# Patient Record
Sex: FEMALE
[De-identification: names, ages, dates, MRNs, and addresses within clinical notes are randomized per-mention and may not be internally consistent; named-entity substitution may affect disease eponyms.]

---

## 2022-09-05 ENCOUNTER — NURSE TRIAGE (OUTPATIENT)
Dept: OTHER | Facility: CLINIC | Age: 27
End: 2022-09-05

## 2022-09-05 NOTE — TELEPHONE ENCOUNTER
Reason for Disposition   [1] COVID-19 diagnosed by positive lab test (e.g., PCR, rapid self-test kit) AND [2] mild symptoms (e.g., cough, fever, others) AND [3] no complications or SOB    Protocols used: Coronavirus (COVID-19) Diagnosed or Suspected-ADULT-    Subjective: Caller states \"I had a positive Covid test this morning\"     Current Symptoms: Caller reports a fever and sore neck    Onset: 1 day    Associated Symptoms: Pain and fever    Pain Severity: mild    Temperature: 100.3    What has been tried: Dayquil    LMP: NA Pregnant: No    Recommended disposition: Home care advice    Care advice provided, patient verbalizes understanding; denies any other questions or concerns; instructed to call back for any new or worsening symptoms. This triage is a result of a call to [address] [place] [address]. Please do not respond to the triage nurse through this encounter. Any subsequent communication should be directly with the patient.

## 2023-11-09 NOTE — PROGRESS NOTES
"Subjective   Patient ID: Karlie Bay is a 28 y.o. female who presents for Annual Exam.    HPI    Annual physical     Eats a generally healthy diet   Exercises 2 x  a week  Denies any chest pain,SOB  No Abdominal pain   No black or bloody stools   Urination/BM normal   Last eye apt 20 years   Last dental apt 6 month  Last Gyn apt 1 year  No new family h/o cancers or heart disease  Does get occasional constipation.    Declined flu vacc today    No other concern    Review of systems  ; Patient seen today for exam denies any problems with headaches or vision, denies any shortness of breath chest pain nausea or vomiting, no black stool no blood in the stool no heartburn type symptoms denies any problems with constipation or diarrhea, and no dysuria-type symptoms    The patient's allergies medications were reviewed with them today    The patient's social family and surgical history or also reviewed here today, along with her past medical history.     Objective     Alert and active in  no acute distress  HEENT TMs clear oropharynx negative nares clear no drainage noted neck supple  With no adenopathy   Heart regular rate and rhythm without murmur and no carotid bruits  Lungs- clear to auscultation bilaterally, no wheeze or rhonchi noted  Thyroid -negative masses or nodularity  Abdomen- soft times four quadrants , bowel sounds positive no masses or organomegaly, negative tenderness guarding or rebound  Neurological exam unremarkable- DTRs in upper and lower extremities within normal limits.   skin -no lesions noted      BP 96/58 (BP Location: Right arm, Patient Position: Sitting, BP Cuff Size: Adult)   Pulse 76   Temp 36.5 °C (97.7 °F) (Temporal)   Resp 16   Ht 1.676 m (5' 6\")   Wt 66.2 kg (146 lb)   SpO2 98%   BMI 23.57 kg/m²     Allergies   Allergen Reactions    Amoxicillin Hives and Rash     Happened when she was much younger       Assessment/Plan   Problem List Items Addressed This Visit       BMI 23.0-23.9, " adult    Routine general medical examination at a health care facility - Primary    Relevant Orders    Lipid Panel    CBC and Auto Differential    Comprehensive Metabolic Panel     Form completed for her today.    Recommend less bananas.     Labs have been ordered, she/he will have these performed and we will contact her/him with results.  (CBC, CMP, Lipid)    If anything worsens or changes please call us at once, follow up in the office as planned.    Scribe Attestation  By signing my name below, I, Trisha Acosta MA, Scribe   attest that this documentation has been prepared under the direction and in the presence of Stephan Spaulding DO.

## 2023-11-10 ENCOUNTER — OFFICE VISIT (OUTPATIENT)
Dept: PRIMARY CARE | Facility: CLINIC | Age: 28
End: 2023-11-10
Payer: COMMERCIAL

## 2023-11-10 VITALS
SYSTOLIC BLOOD PRESSURE: 96 MMHG | DIASTOLIC BLOOD PRESSURE: 58 MMHG | WEIGHT: 146 LBS | RESPIRATION RATE: 16 BRPM | BODY MASS INDEX: 23.46 KG/M2 | TEMPERATURE: 97.7 F | HEIGHT: 66 IN | HEART RATE: 76 BPM | OXYGEN SATURATION: 98 %

## 2023-11-10 DIAGNOSIS — Z00.00 ROUTINE GENERAL MEDICAL EXAMINATION AT A HEALTH CARE FACILITY: Primary | ICD-10-CM

## 2023-11-10 PROCEDURE — 1036F TOBACCO NON-USER: CPT | Performed by: FAMILY MEDICINE

## 2023-11-10 PROCEDURE — 99395 PREV VISIT EST AGE 18-39: CPT | Performed by: FAMILY MEDICINE

## 2023-11-10 PROCEDURE — 3008F BODY MASS INDEX DOCD: CPT | Performed by: FAMILY MEDICINE

## 2023-11-10 ASSESSMENT — PATIENT HEALTH QUESTIONNAIRE - PHQ9
2. FEELING DOWN, DEPRESSED OR HOPELESS: NOT AT ALL
1. LITTLE INTEREST OR PLEASURE IN DOING THINGS: NOT AT ALL
SUM OF ALL RESPONSES TO PHQ9 QUESTIONS 1 AND 2: 0

## 2024-12-05 PROBLEM — Z13.220 LIPID SCREENING: Status: ACTIVE | Noted: 2023-11-10

## 2024-12-05 NOTE — PROGRESS NOTES
Subjective   Patient ID: Karlie Bay is a 29 y.o. female who presents for Annual Exam.  HPI  Annual physical   Eats a generally healthy diet   Exercises regularly  Denies any chest pain,SOB  Last eye apt: never  Last dental apt: 6 months ago     Repeat 100/58    Patient would like her right foot second to the pinky toe checked.   Patient states when she was pregnant, she tripped over a bouncer and the toe turned purple and lasted for 2-3 weeks.  Currently, it is looking bigger than the other toes.   Patient states it is still bothering her.    Pt is having coughs with clear mucus since Friday   She did not test for Covid     She is in postpartum phase.   Her baby was born on 10/5/24 through [facility].   She gained weight during pregnancy, reducing now.   Not breast-feeding  She is on birth control pill  She will take some more time to get back to work      No other concern/question   Review of systems  ; Patient seen today for exam denies any problems with headaches or vision, denies any shortness of breath chest pain nausea or vomiting, no black stool no blood in the stool no heartburn type symptoms denies any problems with constipation or diarrhea, and no dysuria-type symptoms    The patient's allergies medications were reviewed with them today    The patient's social family and surgical history or also reviewed here today, along with her past medical history.     Objective     Alert and active in  no acute distress  HEENT TMs clear oropharynx negative nares clear no drainage noted neck supple  With no adenopathy   Heart regular rate and rhythm without murmur and no carotid bruits  Lungs- clear to auscultation bilaterally, no wheeze or rhonchi noted  Thyroid -negative masses or nodularity  Abdomen- soft times four quadrants , bowel sounds positive no masses or organomegaly, negative tenderness guarding or rebound  Neurological exam unremarkable- DTRs in upper and lower extremities within normal limits.   skin -no  "lesions noted    Right foot second toe healed well probable small avulsion in the past slight sensitivity no other abnormality noted      /64 (BP Location: Left arm, Patient Position: Sitting)   Pulse 79   Temp 36.1 °C (96.9 °F) (Temporal)   Ht 1.676 m (5' 6\")   Wt 72.6 kg (160 lb)   LMP  (LMP Unknown)   SpO2 99%   BMI 25.82 kg/m²     Allergies   Allergen Reactions    Amoxicillin Hives and Rash     Happened when she was much younger       Assessment/Plan   Problem List Items Addressed This Visit       Lipid screening    Relevant Orders    Lipid Panel     Other Visit Diagnoses       Routine general medical examination at a health care facility    -  Primary    BMI 25.0-25.9,adult        Acute recurrent frontal sinusitis        Relevant Medications    azithromycin (Zithromax) 250 mg tablet          Ordered BW (lipid) today.    Prescribed Zithromax today. Advised to start the medication if coughs not improved in the next 2-3 days, otherwise hold.     Recommended to ear comfortable shoes.    If anything worsens or changes please call us at once, follow up in the office as planned,       Scribe Attestation  By signing my name below, IEllen, Scribe   attest that this documentation has been prepared under the direction and in the presence of Stephan Spaulding DO.  "

## 2024-12-06 ENCOUNTER — APPOINTMENT (OUTPATIENT)
Dept: PRIMARY CARE | Facility: CLINIC | Age: 29
End: 2024-12-06
Payer: COMMERCIAL

## 2024-12-06 VITALS
OXYGEN SATURATION: 99 % | HEIGHT: 66 IN | SYSTOLIC BLOOD PRESSURE: 102 MMHG | BODY MASS INDEX: 25.71 KG/M2 | WEIGHT: 160 LBS | HEART RATE: 79 BPM | TEMPERATURE: 96.9 F | DIASTOLIC BLOOD PRESSURE: 64 MMHG

## 2024-12-06 DIAGNOSIS — Z00.00 ROUTINE GENERAL MEDICAL EXAMINATION AT A HEALTH CARE FACILITY: Primary | ICD-10-CM

## 2024-12-06 DIAGNOSIS — J01.11 ACUTE RECURRENT FRONTAL SINUSITIS: ICD-10-CM

## 2024-12-06 DIAGNOSIS — Z13.220 LIPID SCREENING: ICD-10-CM

## 2024-12-06 PROCEDURE — 99395 PREV VISIT EST AGE 18-39: CPT | Performed by: FAMILY MEDICINE

## 2024-12-06 PROCEDURE — 3008F BODY MASS INDEX DOCD: CPT | Performed by: FAMILY MEDICINE

## 2024-12-06 PROCEDURE — 1036F TOBACCO NON-USER: CPT | Performed by: FAMILY MEDICINE

## 2024-12-06 RX ORDER — AZITHROMYCIN 250 MG/1
TABLET, FILM COATED ORAL
Qty: 6 TABLET | Refills: 0 | Status: SHIPPED | OUTPATIENT
Start: 2024-12-06

## 2024-12-06 ASSESSMENT — PATIENT HEALTH QUESTIONNAIRE - PHQ9
2. FEELING DOWN, DEPRESSED OR HOPELESS: NOT AT ALL
SUM OF ALL RESPONSES TO PHQ9 QUESTIONS 1 AND 2: 0
1. LITTLE INTEREST OR PLEASURE IN DOING THINGS: NOT AT ALL
1. LITTLE INTEREST OR PLEASURE IN DOING THINGS: NOT AT ALL
SUM OF ALL RESPONSES TO PHQ9 QUESTIONS 1 AND 2: 0
2. FEELING DOWN, DEPRESSED OR HOPELESS: NOT AT ALL

## 2025-08-28 ENCOUNTER — APPOINTMENT (OUTPATIENT)
Dept: PRIMARY CARE | Facility: CLINIC | Age: 30
End: 2025-08-28
Payer: COMMERCIAL

## 2025-08-28 VITALS
SYSTOLIC BLOOD PRESSURE: 112 MMHG | OXYGEN SATURATION: 99 % | HEIGHT: 67 IN | WEIGHT: 154 LBS | HEART RATE: 80 BPM | BODY MASS INDEX: 24.17 KG/M2 | TEMPERATURE: 97.8 F | DIASTOLIC BLOOD PRESSURE: 70 MMHG

## 2025-08-28 DIAGNOSIS — Z00.00 ROUTINE GENERAL MEDICAL EXAMINATION AT A HEALTH CARE FACILITY: Primary | ICD-10-CM

## 2025-08-28 DIAGNOSIS — D17.23 LIPOMA OF RIGHT LOWER EXTREMITY: ICD-10-CM

## 2025-08-28 DIAGNOSIS — Z13.220 LIPID SCREENING: ICD-10-CM

## 2025-08-28 LAB
ALBUMIN SERPL-MCNC: 4.1 G/DL (ref 3.6–5.1)
ALP SERPL-CCNC: 40 U/L (ref 31–125)
ALT SERPL-CCNC: 11 U/L (ref 6–29)
ANION GAP SERPL CALCULATED.4IONS-SCNC: 7 MMOL/L (CALC) (ref 7–17)
AST SERPL-CCNC: 13 U/L (ref 10–30)
BASOPHILS # BLD AUTO: 20 CELLS/UL (ref 0–200)
BASOPHILS NFR BLD AUTO: 0.5 %
BILIRUB SERPL-MCNC: 0.6 MG/DL (ref 0.2–1.2)
BUN SERPL-MCNC: 13 MG/DL (ref 7–25)
CALCIUM SERPL-MCNC: 9.2 MG/DL (ref 8.6–10.2)
CHLORIDE SERPL-SCNC: 102 MMOL/L (ref 98–110)
CHOLEST SERPL-MCNC: 182 MG/DL
CHOLEST/HDLC SERPL: 2.8 (CALC)
CO2 SERPL-SCNC: 27 MMOL/L (ref 20–32)
CREAT SERPL-MCNC: 0.72 MG/DL (ref 0.5–0.97)
EGFRCR SERPLBLD CKD-EPI 2021: 115 ML/MIN/1.73M2
EOSINOPHIL # BLD AUTO: 120 CELLS/UL (ref 15–500)
EOSINOPHIL NFR BLD AUTO: 3 %
ERYTHROCYTE [DISTWIDTH] IN BLOOD BY AUTOMATED COUNT: 11.7 % (ref 11–15)
GLUCOSE SERPL-MCNC: 90 MG/DL (ref 65–99)
HCT VFR BLD AUTO: 40 % (ref 35–45)
HDLC SERPL-MCNC: 65 MG/DL
HGB BLD-MCNC: 13.3 G/DL (ref 11.7–15.5)
LDLC SERPL CALC-MCNC: 103 MG/DL (CALC)
LYMPHOCYTES # BLD AUTO: 1372 CELLS/UL (ref 850–3900)
LYMPHOCYTES NFR BLD AUTO: 34.3 %
MCH RBC QN AUTO: 31 PG (ref 27–33)
MCHC RBC AUTO-ENTMCNC: 33.3 G/DL (ref 32–36)
MCV RBC AUTO: 93.2 FL (ref 80–100)
MONOCYTES # BLD AUTO: 292 CELLS/UL (ref 200–950)
MONOCYTES NFR BLD AUTO: 7.3 %
NEUTROPHILS # BLD AUTO: 2196 CELLS/UL (ref 1500–7800)
NEUTROPHILS NFR BLD AUTO: 54.9 %
NONHDLC SERPL-MCNC: 117 MG/DL (CALC)
PLATELET # BLD AUTO: 220 THOUSAND/UL (ref 140–400)
PMV BLD REES-ECKER: 9.7 FL (ref 7.5–12.5)
POTASSIUM SERPL-SCNC: 4.1 MMOL/L (ref 3.5–5.3)
PROT SERPL-MCNC: 6.8 G/DL (ref 6.1–8.1)
RBC # BLD AUTO: 4.29 MILLION/UL (ref 3.8–5.1)
SODIUM SERPL-SCNC: 136 MMOL/L (ref 135–146)
TRIGL SERPL-MCNC: 57 MG/DL
WBC # BLD AUTO: 4 THOUSAND/UL (ref 3.8–10.8)

## 2025-08-28 PROCEDURE — 1036F TOBACCO NON-USER: CPT | Performed by: FAMILY MEDICINE

## 2025-08-28 PROCEDURE — 3008F BODY MASS INDEX DOCD: CPT | Performed by: FAMILY MEDICINE

## 2025-08-28 PROCEDURE — 99395 PREV VISIT EST AGE 18-39: CPT | Performed by: FAMILY MEDICINE

## 2025-08-28 ASSESSMENT — PATIENT HEALTH QUESTIONNAIRE - PHQ9
SUM OF ALL RESPONSES TO PHQ9 QUESTIONS 1 AND 2: 0
2. FEELING DOWN, DEPRESSED OR HOPELESS: NOT AT ALL
1. LITTLE INTEREST OR PLEASURE IN DOING THINGS: NOT AT ALL